# Patient Record
(demographics unavailable — no encounter records)

---

## 2021-08-30 NOTE — HOLTER MONITOR
HOLTER MONITOR


DATE OF PROCEDURE: 08/03/2021-08/17/2021.





INDICATION: Premature ventricular complexes.





PROCEDURE: A 14-day Holter monitor was obtained for a total of 14 days. The 

study quality is adequate.





RESULTS:


1.  Baseline sinus rhythm with an average heart rate of 73 bpm, ranging from 45-

149 bpm.


2.  There were rare (27), isolated premature supraventricular complexes.


3.  There were rare (1), isolated premature ventricular complexes.


4.  There were no pauses exceeding 2 seconds in duration.


5.  There was 1 patient event that correlated to sinus rhythm at a heart rate of

85 bpm with no arrhythmias.





IMPRESSION:


1.  This is a 14-day extended Holter monitor report.


2.  Baseline sinus rhythm with an average heart rate of 73 bpm, ranging from 45-

149 bpm with rare, isolated premature supraventricular and premature ventricular

complexes.


3.  There was 1 patient event that correlated to sinus rhythm at a heart rate of

85 bpm with no arrhythmias.





Certain portions of this document may have been dictated utilizing voice 

recognition technology.  Inherent to this technology, typographical and 

grammatical errors may exist.  As much as I am diligent to identify and correct 

these mistakes, some errors may remain in the document.











DEENA NEVILLE JR, MD         Aug 30, 2021 12:24